# Patient Record
(demographics unavailable — no encounter records)

---

## 2024-12-18 NOTE — COUNSELING
[Nutrition/ Exercise/ Weight Management] : nutrition, exercise, weight management [Contraception/ Emergency Contraception/ Safe Sexual Practices] : contraception, emergency contraception, safe sexual practices [Preconception Care/ Fertility options] : preconception care, fertility options [STD (testing, results, tx)] : STD (testing, results, tx) [Medication Management] : medication management

## 2024-12-18 NOTE — REASON FOR VISIT
[Annual] : an annual visit. [[employer] ] : provided by [employer]   [Interpreters_IDNumber] : [id number] [TWNoteComboBox1] : [place]

## 2024-12-18 NOTE — DISCUSSION/SUMMARY
[FreeTextEntry1] : 26 yo P1, here for annual, desiring pregnancy  #HCM -Pap collected -GCCT-T collected, STI labs ordered -encouraged exercise and healthy diet -Flu shot today  #Preconception -Nexplanon removed without difficulty per pt request.  -Prenatal labs ordered with [email or web address] carrier screening -Advised to start PNV  RTC 1 year for annual or if pregnancy occurs

## 2024-12-18 NOTE — HISTORY OF PRESENT ILLNESS
[FreeTextEntry1] : 26 yo , with Nexplanon, amenorrheic, presents for annual. Pt desires Nexplanon removal today. She desires another pregnancy. She is sexually active with her . Denies any concerns about intercourse. Not yet taking PNV.   OB hx;   x1, 5 yrs old  [No] : Patient does not have concerns regarding sex

## 2024-12-18 NOTE — PHYSICAL EXAM
[Chaperone Present] : A chaperone was present in the examining room during all aspects of the physical examination [Appropriately responsive] : appropriately responsive [Alert] : alert [No Acute Distress] : no acute distress [Soft] : soft [Non-tender] : non-tender [No Lesions] : no lesions [Oriented x3] : oriented x3 [Examination Of The Breasts] : a normal appearance [No Masses] : no breast masses were palpable [Labia Majora] : normal [Labia Minora] : normal [Normal] : normal [Uterine Adnexae] : normal [FreeTextEntry2] : Kristie